# Patient Record
Sex: FEMALE | Race: WHITE | NOT HISPANIC OR LATINO | Employment: UNEMPLOYED | URBAN - METROPOLITAN AREA
[De-identification: names, ages, dates, MRNs, and addresses within clinical notes are randomized per-mention and may not be internally consistent; named-entity substitution may affect disease eponyms.]

---

## 2020-09-15 ENCOUNTER — ESTABLISHED COMPREHENSIVE EXAM (OUTPATIENT)
Dept: URBAN - METROPOLITAN AREA CLINIC 32 | Facility: CLINIC | Age: 75
End: 2020-09-15

## 2020-09-15 DIAGNOSIS — H25.13: ICD-10-CM

## 2020-09-15 DIAGNOSIS — H25.013: ICD-10-CM

## 2020-09-15 PROCEDURE — 92015 DETERMINE REFRACTIVE STATE: CPT

## 2020-09-15 PROCEDURE — 92014 COMPRE OPH EXAM EST PT 1/>: CPT

## 2020-09-15 ASSESSMENT — KERATOMETRY
OD_AXISANGLE_DEGREES: 93
OS_AXISANGLE_DEGREES: 117
OD_K2POWER_DIOPTERS: 44
OS_AXISANGLE2_DEGREES: 27
OS_K2POWER_DIOPTERS: 44.75
OD_K1POWER_DIOPTERS: 42.75
OD_AXISANGLE2_DEGREES: 3
OS_K1POWER_DIOPTERS: 44

## 2020-09-15 ASSESSMENT — VISUAL ACUITY
OD_SC: 20/400
OS_GLARE: <20/400
OD_CC: J2
OD_CC: 20/30+2
OD_GLARE: <20/400
OS_SC: 20/200
OS_SC: J16-1
OS_CC: 20/30-2
OD_SC: 20/200
OS_CC: J3-2

## 2020-09-15 ASSESSMENT — TONOMETRY
OD_IOP_MMHG: 11
OS_IOP_MMHG: 11

## 2020-10-29 ENCOUNTER — SURGICAL TESTING (OUTPATIENT)
Dept: URBAN - METROPOLITAN AREA CLINIC 32 | Facility: CLINIC | Age: 75
End: 2020-10-29

## 2020-10-29 DIAGNOSIS — H25.011: ICD-10-CM

## 2020-10-29 DIAGNOSIS — H25.12: ICD-10-CM

## 2020-10-29 DIAGNOSIS — E11.9: ICD-10-CM

## 2020-10-29 PROCEDURE — 92014 COMPRE OPH EXAM EST PT 1/>: CPT

## 2020-10-29 PROCEDURE — 92136TC INTERFEROMETRY - TECHNICAL COMPONENT

## 2020-10-29 PROCEDURE — 92286 ANT SGM IMG I&R SPECLR MIC: CPT

## 2020-10-29 PROCEDURE — 92025 CPTRIZED CORNEAL TOPOGRAPHY: CPT

## 2020-10-29 PROCEDURE — 92134 CPTRZ OPH DX IMG PST SGM RTA: CPT

## 2020-10-29 ASSESSMENT — VISUAL ACUITY
OD_CC: 20/30
OS_CC: J1
OS_CC: 20/30
OS_SC: 20/200
OD_SC: 20/400
OD_GLARE: 20/400
OD_SC: 20/200
OD_CC: J1
OS_GLARE: 20/400
OS_SC: J16

## 2020-10-29 ASSESSMENT — KERATOMETRY
OS_K1POWER_DIOPTERS: 44
OD_K2POWER_DIOPTERS: 44
OD_AXISANGLE2_DEGREES: 3
OS_AXISANGLE2_DEGREES: 27
OD_AXISANGLE_DEGREES: 93
OS_K2POWER_DIOPTERS: 44.75
OD_K1POWER_DIOPTERS: 42.75
OS_AXISANGLE_DEGREES: 117

## 2020-12-02 ENCOUNTER — SURGERY/PROCEDURE (OUTPATIENT)
Dept: URBAN - METROPOLITAN AREA CLINIC 32 | Facility: CLINIC | Age: 75
End: 2020-12-02

## 2020-12-02 DIAGNOSIS — H25.12: ICD-10-CM

## 2020-12-02 PROCEDURE — 66984 XCAPSL CTRC RMVL W/O ECP: CPT

## 2020-12-03 ENCOUNTER — 1 DAY POST-OP (OUTPATIENT)
Dept: URBAN - METROPOLITAN AREA CLINIC 32 | Facility: CLINIC | Age: 75
End: 2020-12-03

## 2020-12-03 DIAGNOSIS — Z96.1: ICD-10-CM

## 2020-12-03 PROCEDURE — 99024 POSTOP FOLLOW-UP VISIT: CPT

## 2020-12-03 ASSESSMENT — KERATOMETRY
OS_K2POWER_DIOPTERS: 44.75
OS_AXISANGLE_DEGREES: 117
OD_K2POWER_DIOPTERS: 44
OD_AXISANGLE2_DEGREES: 3
OD_AXISANGLE_DEGREES: 93
OD_K1POWER_DIOPTERS: 42.75
OS_K1POWER_DIOPTERS: 44
OS_AXISANGLE2_DEGREES: 27

## 2020-12-03 ASSESSMENT — TONOMETRY: OS_IOP_MMHG: 15

## 2020-12-07 ENCOUNTER — POST-OP CATARACT (OUTPATIENT)
Dept: URBAN - METROPOLITAN AREA CLINIC 32 | Facility: CLINIC | Age: 75
End: 2020-12-07

## 2020-12-07 DIAGNOSIS — H25.11: ICD-10-CM

## 2020-12-07 PROCEDURE — 92012 INTRM OPH EXAM EST PATIENT: CPT

## 2020-12-07 ASSESSMENT — KERATOMETRY
OD_AXISANGLE_DEGREES: 93
OS_AXISANGLE_DEGREES: 117
OD_K1POWER_DIOPTERS: 42.75
OD_AXISANGLE2_DEGREES: 3
OD_K2POWER_DIOPTERS: 44
OS_K1POWER_DIOPTERS: 44
OS_K2POWER_DIOPTERS: 44.75
OS_AXISANGLE2_DEGREES: 86
OS_AXISANGLE2_DEGREES: 27
OS_K1POWER_DIOPTERS: 44
OS_AXISANGLE_DEGREES: 117
OS_K2POWER_DIOPTERS: 44.75
OD_K2POWER_DIOPTERS: 44
OS_K1POWER_DIOPTERS: 44.25
OD_AXISANGLE_DEGREES: 93
OS_K2POWER_DIOPTERS: 43.00
OD_AXISANGLE2_DEGREES: 3
OS_AXISANGLE_DEGREES: 176
OS_AXISANGLE2_DEGREES: 27
OD_K1POWER_DIOPTERS: 42.75

## 2020-12-07 ASSESSMENT — TONOMETRY: OS_IOP_MMHG: 10

## 2020-12-07 ASSESSMENT — VISUAL ACUITY
OS_PH: 20/25
OS_SC: 20/80
OS_SC: 20/50
OS_PH: 20/60

## 2020-12-09 ENCOUNTER — SURGERY/PROCEDURE (OUTPATIENT)
Dept: URBAN - METROPOLITAN AREA CLINIC 32 | Facility: CLINIC | Age: 75
End: 2020-12-09

## 2020-12-09 DIAGNOSIS — H25.11: ICD-10-CM

## 2020-12-09 PROCEDURE — 66984 XCAPSL CTRC RMVL W/O ECP: CPT

## 2020-12-10 ENCOUNTER — 1 DAY POST-OP (OUTPATIENT)
Dept: URBAN - METROPOLITAN AREA CLINIC 32 | Facility: CLINIC | Age: 75
End: 2020-12-10

## 2020-12-10 DIAGNOSIS — Z96.1: ICD-10-CM

## 2020-12-10 PROCEDURE — 99024 POSTOP FOLLOW-UP VISIT: CPT

## 2020-12-10 ASSESSMENT — KERATOMETRY
OD_K2POWER_DIOPTERS: 44
OS_K1POWER_DIOPTERS: 44
OS_AXISANGLE_DEGREES: 117
OS_K2POWER_DIOPTERS: 44.75
OD_AXISANGLE_DEGREES: 93
OS_AXISANGLE2_DEGREES: 27
OS_K2POWER_DIOPTERS: 43.00
OS_AXISANGLE_DEGREES: 176
OD_K1POWER_DIOPTERS: 42.75
OS_AXISANGLE2_DEGREES: 86
OS_K1POWER_DIOPTERS: 44.25
OD_AXISANGLE2_DEGREES: 3

## 2020-12-10 ASSESSMENT — VISUAL ACUITY
OD_PH: 20/60
OD_SC: 20/200

## 2020-12-10 ASSESSMENT — TONOMETRY: OD_IOP_MMHG: 14

## 2020-12-11 ASSESSMENT — KERATOMETRY
OS_K2POWER_DIOPTERS: 44.75
OD_AXISANGLE2_DEGREES: 3
OS_K1POWER_DIOPTERS: 44.25
OS_AXISANGLE_DEGREES: 176
OD_K1POWER_DIOPTERS: 42.75
OS_AXISANGLE_DEGREES: 117
OD_AXISANGLE_DEGREES: 93
OS_AXISANGLE2_DEGREES: 86
OD_K2POWER_DIOPTERS: 44
OS_K1POWER_DIOPTERS: 44
OS_AXISANGLE2_DEGREES: 27
OS_K2POWER_DIOPTERS: 43.00

## 2020-12-15 ENCOUNTER — POST-OP (OUTPATIENT)
Dept: URBAN - METROPOLITAN AREA CLINIC 32 | Facility: CLINIC | Age: 75
End: 2020-12-15

## 2020-12-15 DIAGNOSIS — Z96.1: ICD-10-CM

## 2020-12-15 PROCEDURE — 99024 POSTOP FOLLOW-UP VISIT: CPT

## 2020-12-15 ASSESSMENT — KERATOMETRY
OS_K2POWER_DIOPTERS: 44.75
OD_AXISANGLE2_DEGREES: 3
OS_AXISANGLE2_DEGREES: 86
OS_AXISANGLE_DEGREES: 117
OS_K1POWER_DIOPTERS: 44
OS_K2POWER_DIOPTERS: 43.00
OS_K1POWER_DIOPTERS: 44.25
OD_AXISANGLE_DEGREES: 93
OD_K1POWER_DIOPTERS: 42.75
OS_AXISANGLE2_DEGREES: 27
OD_K2POWER_DIOPTERS: 44
OS_AXISANGLE_DEGREES: 176

## 2020-12-15 ASSESSMENT — VISUAL ACUITY
OD_SC: 20/40-2
OS_SC: J7
OD_SC: J7-1
OS_SC: 20/30-2

## 2020-12-15 ASSESSMENT — TONOMETRY
OS_IOP_MMHG: 11
OD_IOP_MMHG: 14

## 2021-01-04 ENCOUNTER — POST-OP CATARACT (OUTPATIENT)
Dept: URBAN - METROPOLITAN AREA CLINIC 32 | Facility: CLINIC | Age: 76
End: 2021-01-04

## 2021-01-04 DIAGNOSIS — Z96.1: ICD-10-CM

## 2021-01-04 PROCEDURE — 99024 POSTOP FOLLOW-UP VISIT: CPT

## 2021-01-04 ASSESSMENT — TONOMETRY
OS_IOP_MMHG: 11
OD_IOP_MMHG: 13

## 2021-01-04 ASSESSMENT — VISUAL ACUITY
OD_SC: 20/40+2
OS_SC: 20/30+2

## 2021-01-04 ASSESSMENT — KERATOMETRY
OD_AXISANGLE_DEGREES: 93
OD_K1POWER_DIOPTERS: 42.75
OS_K2POWER_DIOPTERS: 43.00
OS_K1POWER_DIOPTERS: 44.25
OS_AXISANGLE2_DEGREES: 27
OS_AXISANGLE2_DEGREES: 86
OS_K2POWER_DIOPTERS: 44.75
OS_K1POWER_DIOPTERS: 44
OD_K2POWER_DIOPTERS: 44
OS_AXISANGLE_DEGREES: 176
OD_AXISANGLE2_DEGREES: 3
OS_AXISANGLE_DEGREES: 117

## 2021-01-18 ENCOUNTER — CONSULT (OUTPATIENT)
Dept: URBAN - METROPOLITAN AREA CLINIC 32 | Facility: CLINIC | Age: 76
End: 2021-01-18

## 2021-01-18 DIAGNOSIS — H02.831: ICD-10-CM

## 2021-01-18 DIAGNOSIS — H02.834: ICD-10-CM

## 2021-01-18 DIAGNOSIS — Z96.1: ICD-10-CM

## 2021-01-18 PROCEDURE — 92285 EXTERNAL OCULAR PHOTOGRAPHY: CPT

## 2021-01-18 PROCEDURE — 92081 LIMITED VISUAL FIELD XM: CPT

## 2021-01-18 PROCEDURE — 92012 INTRM OPH EXAM EST PATIENT: CPT

## 2021-01-18 ASSESSMENT — KERATOMETRY
OS_AXISANGLE_DEGREES: 117
OS_AXISANGLE2_DEGREES: 27
OD_AXISANGLE2_DEGREES: 3
OS_K2POWER_DIOPTERS: 43.00
OS_AXISANGLE_DEGREES: 176
OS_K1POWER_DIOPTERS: 44
OD_K2POWER_DIOPTERS: 44
OS_AXISANGLE2_DEGREES: 86
OS_K1POWER_DIOPTERS: 44.25
OS_K2POWER_DIOPTERS: 44.75
OD_K1POWER_DIOPTERS: 42.75
OD_AXISANGLE_DEGREES: 93

## 2021-01-18 ASSESSMENT — VISUAL ACUITY
OD_SC: 20/40
OS_SC: 20/30

## 2021-02-04 ENCOUNTER — SURGERY/PROCEDURE (OUTPATIENT)
Dept: URBAN - METROPOLITAN AREA CLINIC 32 | Facility: CLINIC | Age: 76
End: 2021-02-04

## 2021-02-04 DIAGNOSIS — H02.834: ICD-10-CM

## 2021-02-04 DIAGNOSIS — H02.831: ICD-10-CM

## 2021-02-04 PROCEDURE — 1582350 UPPER BLEPH PER EYE FUNCTIONAL-BILATERAL

## 2021-02-04 ASSESSMENT — KERATOMETRY
OD_AXISANGLE_DEGREES: 93
OS_K2POWER_DIOPTERS: 43.00
OS_K1POWER_DIOPTERS: 44.25
OD_K1POWER_DIOPTERS: 42.75
OD_K2POWER_DIOPTERS: 44
OD_AXISANGLE2_DEGREES: 3
OS_K2POWER_DIOPTERS: 44.75
OS_AXISANGLE2_DEGREES: 86
OS_K1POWER_DIOPTERS: 44
OS_AXISANGLE_DEGREES: 176
OS_AXISANGLE2_DEGREES: 27
OS_AXISANGLE_DEGREES: 117

## 2021-02-05 ENCOUNTER — POST-OP (OUTPATIENT)
Dept: URBAN - METROPOLITAN AREA CLINIC 32 | Facility: CLINIC | Age: 76
End: 2021-02-05

## 2021-02-05 DIAGNOSIS — Z98.890: ICD-10-CM

## 2021-02-05 PROCEDURE — 99024 POSTOP FOLLOW-UP VISIT: CPT

## 2021-02-05 ASSESSMENT — KERATOMETRY
OS_AXISANGLE_DEGREES: 176
OD_K2POWER_DIOPTERS: 44
OS_K1POWER_DIOPTERS: 44
OS_K2POWER_DIOPTERS: 43.00
OS_K1POWER_DIOPTERS: 44.25
OS_AXISANGLE2_DEGREES: 86
OS_AXISANGLE_DEGREES: 117
OD_AXISANGLE_DEGREES: 93
OS_AXISANGLE2_DEGREES: 27
OD_AXISANGLE2_DEGREES: 3
OD_K1POWER_DIOPTERS: 42.75
OS_K2POWER_DIOPTERS: 44.75

## 2021-02-05 ASSESSMENT — VISUAL ACUITY
OD_SC: 20/40
OS_SC: 20/30

## 2021-02-12 ENCOUNTER — POST-OP (OUTPATIENT)
Dept: URBAN - METROPOLITAN AREA CLINIC 32 | Facility: CLINIC | Age: 76
End: 2021-02-12

## 2021-02-12 DIAGNOSIS — Z98.890: ICD-10-CM

## 2021-02-12 PROCEDURE — 99024 POSTOP FOLLOW-UP VISIT: CPT

## 2021-02-12 ASSESSMENT — KERATOMETRY
OS_AXISANGLE_DEGREES: 176
OS_AXISANGLE_DEGREES: 117
OD_AXISANGLE_DEGREES: 93
OS_AXISANGLE2_DEGREES: 27
OS_K2POWER_DIOPTERS: 43.00
OD_K2POWER_DIOPTERS: 44
OS_K1POWER_DIOPTERS: 44.25
OS_K2POWER_DIOPTERS: 44.75
OS_K1POWER_DIOPTERS: 44
OD_AXISANGLE2_DEGREES: 3
OD_K1POWER_DIOPTERS: 42.75
OS_AXISANGLE2_DEGREES: 86

## 2021-02-12 ASSESSMENT — VISUAL ACUITY
OD_SC: 20/25-2
OS_SC: 20/25-1
OU_SC: 20/25-1

## 2021-02-13 ENCOUNTER — EST. PATIENT EMERGENCY (OUTPATIENT)
Dept: URBAN - METROPOLITAN AREA CLINIC 32 | Facility: CLINIC | Age: 76
End: 2021-02-13

## 2021-02-13 DIAGNOSIS — T81.30XA: ICD-10-CM

## 2021-02-13 DIAGNOSIS — Z98.890: ICD-10-CM

## 2021-02-13 PROCEDURE — 12020 TX SUPFC WND DEHSN SMPL CLSR: CPT

## 2021-02-22 ASSESSMENT — KERATOMETRY
OS_K1POWER_DIOPTERS: 44.25
OS_AXISANGLE_DEGREES: 176
OD_AXISANGLE_DEGREES: 93
OD_K1POWER_DIOPTERS: 42.75
OD_K2POWER_DIOPTERS: 44
OS_K1POWER_DIOPTERS: 44
OS_K2POWER_DIOPTERS: 44.75
OS_K2POWER_DIOPTERS: 43.00
OS_AXISANGLE_DEGREES: 117
OD_AXISANGLE2_DEGREES: 3
OS_AXISANGLE2_DEGREES: 86
OS_AXISANGLE2_DEGREES: 27

## 2021-03-01 ENCOUNTER — OFFICE VISIT (OUTPATIENT)
Age: 76
End: 2021-03-01

## 2021-03-05 ENCOUNTER — FOLLOW UP (OUTPATIENT)
Dept: URBAN - METROPOLITAN AREA CLINIC 32 | Facility: CLINIC | Age: 76
End: 2021-03-05

## 2021-03-05 DIAGNOSIS — Z98.890: ICD-10-CM

## 2021-03-05 DIAGNOSIS — T81.30XD: ICD-10-CM

## 2021-03-05 PROCEDURE — 99024 POSTOP FOLLOW-UP VISIT: CPT

## 2021-03-05 ASSESSMENT — KERATOMETRY
OD_AXISANGLE2_DEGREES: 3
OS_K1POWER_DIOPTERS: 44
OS_AXISANGLE_DEGREES: 117
OS_K2POWER_DIOPTERS: 44.75
OS_AXISANGLE_DEGREES: 176
OS_K1POWER_DIOPTERS: 44.25
OD_K2POWER_DIOPTERS: 44
OD_AXISANGLE_DEGREES: 93
OD_K1POWER_DIOPTERS: 42.75
OS_AXISANGLE2_DEGREES: 27
OS_K2POWER_DIOPTERS: 43.00
OS_AXISANGLE2_DEGREES: 86

## 2021-03-05 ASSESSMENT — VISUAL ACUITY
OS_SC: 20/25-1
OD_SC: 20/25-1

## 2021-04-01 ENCOUNTER — OFFICE VISIT (OUTPATIENT)
Age: 76
End: 2021-04-01

## 2021-11-01 ENCOUNTER — OFFICE VISIT (OUTPATIENT)
Age: 76
End: 2021-11-01

## 2021-11-10 ENCOUNTER — OFFICE VISIT (OUTPATIENT)
Dept: URBAN - METROPOLITAN AREA CLINIC 9 | Facility: CLINIC | Age: 76
End: 2021-11-10

## 2021-11-17 ENCOUNTER — OFFICE VISIT (OUTPATIENT)
Dept: URBAN - METROPOLITAN AREA CLINIC 9 | Facility: CLINIC | Age: 76
End: 2021-11-17

## 2021-11-23 ENCOUNTER — OFFICE VISIT (OUTPATIENT)
Dept: URBAN - METROPOLITAN AREA CLINIC 9 | Facility: CLINIC | Age: 76
End: 2021-11-23

## 2021-12-01 ENCOUNTER — OFFICE VISIT (OUTPATIENT)
Dept: URBAN - METROPOLITAN AREA CLINIC 9 | Facility: CLINIC | Age: 76
End: 2021-12-01

## 2022-03-07 ENCOUNTER — COMPREHENSIVE EXAM (OUTPATIENT)
Dept: URBAN - METROPOLITAN AREA CLINIC 32 | Facility: CLINIC | Age: 77
End: 2022-03-07

## 2022-03-07 DIAGNOSIS — Z96.1: ICD-10-CM

## 2022-03-07 DIAGNOSIS — H16.223: ICD-10-CM

## 2022-03-07 DIAGNOSIS — E11.9: ICD-10-CM

## 2022-03-07 DIAGNOSIS — H26.493: ICD-10-CM

## 2022-03-07 PROCEDURE — 92015 DETERMINE REFRACTIVE STATE: CPT

## 2022-03-07 PROCEDURE — 92014 COMPRE OPH EXAM EST PT 1/>: CPT

## 2022-03-07 ASSESSMENT — TONOMETRY
OS_IOP_MMHG: 12
OD_IOP_MMHG: 17

## 2022-03-07 ASSESSMENT — KERATOMETRY
OS_AXISANGLE2_DEGREES: 27
OD_AXISANGLE_DEGREES: 93
OS_AXISANGLE2_DEGREES: 86
OD_K2POWER_DIOPTERS: 44
OD_K1POWER_DIOPTERS: 42.75
OS_K1POWER_DIOPTERS: 44
OS_AXISANGLE_DEGREES: 176
OD_AXISANGLE2_DEGREES: 3
OS_K2POWER_DIOPTERS: 43.00
OS_K1POWER_DIOPTERS: 44.25
OS_K2POWER_DIOPTERS: 44.75
OS_AXISANGLE_DEGREES: 117

## 2022-03-07 ASSESSMENT — VISUAL ACUITY
OD_CC: J1+
OS_CC: J1+
OS_SC: 20/25-1
OD_SC: 20/25-1

## 2022-03-15 ENCOUNTER — OFFICE VISIT (OUTPATIENT)
Dept: URBAN - METROPOLITAN AREA CLINIC 9 | Facility: CLINIC | Age: 77
End: 2022-03-15

## 2022-03-30 ENCOUNTER — OFFICE VISIT (OUTPATIENT)
Dept: URBAN - METROPOLITAN AREA CLINIC 9 | Facility: CLINIC | Age: 77
End: 2022-03-30

## 2022-04-25 ENCOUNTER — TELEPHONE ENCOUNTER (OUTPATIENT)
Dept: URBAN - METROPOLITAN AREA CLINIC 9 | Facility: CLINIC | Age: 77
End: 2022-04-25

## 2022-04-27 ENCOUNTER — OFFICE VISIT (OUTPATIENT)
Dept: URBAN - METROPOLITAN AREA CLINIC 9 | Facility: CLINIC | Age: 77
End: 2022-04-27

## 2022-06-04 ENCOUNTER — TELEPHONE ENCOUNTER (OUTPATIENT)
Dept: URBAN - METROPOLITAN AREA CLINIC 68 | Facility: CLINIC | Age: 77
End: 2022-06-04

## 2022-06-04 RX ORDER — ATENOLOL 50 MG/1
TABLET ORAL
OUTPATIENT
Start: 2008-11-18 | End: 2014-10-29

## 2022-06-04 RX ORDER — ASCORBIC ACID 1000 MG
MAGNESIUM OXIDE( 250MG ORAL  DAILY ) INACTIVE -HX ENTRY TABLET ORAL DAILY
OUTPATIENT
Start: 2019-01-14

## 2022-06-04 RX ORDER — ESOMEPRAZOLE MAGNESIUM 40 MG
NEXIUM(    ONE CAPSULE BY MOUTH TWICE DAILY ) INACTIVE -HX ENTRY CAPSULE,DELAYED RELEASE (ENTERIC COATED) ORAL
OUTPATIENT
Start: 2009-01-14

## 2022-06-04 RX ORDER — MAGNESIUM OXIDE/MAG AA CHELATE 300 MG
CAPSULE ORAL AS DIRECTED
OUTPATIENT
Start: 2010-09-22 | End: 2014-10-29

## 2022-06-04 RX ORDER — FERROUS SULFATE 325(65) MG
VITAMIN D3( 2000UNIT ORAL  DAILY ) INACTIVE -HX ENTRY TABLET ORAL DAILY
OUTPATIENT
Start: 2019-01-14

## 2022-06-04 RX ORDER — ESOMEPRAZOLE MAGNESIUM 40 MG
NEXIUM(    ONE CAPSULE BY MOUTH TWICE DAILY ) INACTIVE -HX ENTRY CAPSULE,DELAYED RELEASE (ENTERIC COATED) ORAL
OUTPATIENT
Start: 2009-01-26

## 2022-06-04 RX ORDER — DICYCLOMINE HYDROCHLORIDE 20 MG/1
TABLET ORAL
Qty: 90 | Refills: 0 | OUTPATIENT
Start: 2019-05-30 | End: 2019-06-29

## 2022-06-04 RX ORDER — METFORMIN HYDROCHLORIDE 1000 MG/1
METFORMIN HCL(    1 TABLET TWICE DAILY WITH FOOD ) INACTIVE -HX ENTRY TABLET, COATED ORAL
OUTPATIENT
Start: 2009-01-14

## 2022-06-04 RX ORDER — METFORMIN HYDROCHLORIDE 500 MG/1
METFORMIN HCL(    1 TABLET TWICE DAILY WITH FOOD ) INACTIVE -HX ENTRY TABLET, COATED ORAL
OUTPATIENT
Start: 2008-11-18

## 2022-06-04 RX ORDER — TRAMADOL HYDROCHLORIDE 50 MG/1
TRAMADOL HCL(    1 TABLET EVERY 4 TO 6 HOURS AS NEEDED ) INACTIVE -HX ENTRY TABLET ORAL
OUTPATIENT
Start: 2005-12-19

## 2022-06-04 RX ORDER — SITAGLIPTIN AND METFORMIN HYDROCHLORIDE 50; 1000 MG/1; MG/1
JANUMET XR( 50-1000MG ORAL  DAILY ) INACTIVE -HX ENTRY TABLET, FILM COATED, EXTENDED RELEASE ORAL DAILY
OUTPATIENT
Start: 2015-12-23

## 2022-06-04 RX ORDER — ELUXADOLINE 75 MG/1
TABLET, FILM COATED ORAL BID
Qty: 32 | Refills: 0 | OUTPATIENT
Start: 2016-09-07 | End: 2016-09-27

## 2022-06-04 RX ORDER — RIFAXIMIN 550 MG/1
TABLET ORAL
Qty: 42 | Refills: 0 | OUTPATIENT
Start: 2016-09-27 | End: 2016-10-11

## 2022-06-04 RX ORDER — METFORMIN HYDROCHLORIDE 1000 MG/1
METFORMIN HCL ER (OSM)( 1000MG ORAL  TWICE A DAY ) INACTIVE -HX ENTRY TABLET, EXTENDED RELEASE ORAL TWICE A DAY
OUTPATIENT
Start: 2019-05-30

## 2022-06-04 RX ORDER — ESOMEPRAZOLE MAGNESIUM 40 MG
NEXIUM(    ONE CAPSULE BY MOUTH TWICE DAILY ) INACTIVE -HX ENTRY CAPSULE,DELAYED RELEASE (ENTERIC COATED) ORAL
OUTPATIENT
Start: 2009-02-02

## 2022-06-04 RX ORDER — MULTIVIT-MIN/FOLIC/VIT K/LYCOP 400-300MCG
VITAMIN D3(    1 TABLET DAILY ) INACTIVE -HX ENTRY TABLET ORAL
OUTPATIENT
Start: 2010-06-30

## 2022-06-04 RX ORDER — CANAGLIFLOZIN 100 MG/1
INVOKANA( 100MG ORAL 150 MG AS NEEDED ) INACTIVE -HX ENTRY TABLET, FILM COATED ORAL AS NEEDED
OUTPATIENT
Start: 2019-01-14

## 2022-06-04 RX ORDER — COLESEVELAM HYDROCHLORIDE 625 MG/1
WELCHOL(    ONE ORALLY THREE TIMES DAILY ) INACTIVE -HX ENTRY TABLET, FILM COATED ORAL
OUTPATIENT
Start: 2009-06-24

## 2022-06-04 RX ORDER — BUDESONIDE 3 MG/1
CAPSULE, COATED PELLETS ORAL DAILY
Qty: 180 | Refills: 180 | OUTPATIENT
Start: 2019-02-07 | End: 2019-05-30

## 2022-06-04 RX ORDER — MULTIVIT-MIN/FOLIC/VIT K/LYCOP 400-300MCG
TABLET ORAL
OUTPATIENT
Start: 2010-09-22 | End: 2014-10-29

## 2022-06-04 RX ORDER — MELOXICAM 7.5 MG/1
MELOXICAM( 7.5MG ORAL  AS NEEDED ) INACTIVE -HX ENTRY TABLET ORAL AS NEEDED
OUTPATIENT
Start: 2019-01-14

## 2022-06-04 RX ORDER — METAXALONE 800 MG
SKELAXIN(    AS NEEDED ) INACTIVE -HX ENTRY TABLET ORAL AS NEEDED
OUTPATIENT
Start: 2008-11-18

## 2022-06-04 RX ORDER — COLESEVELAM HYDROCHLORIDE 625 MG/1
TABLET, FILM COATED ORAL
Qty: 90 | Refills: 0 | OUTPATIENT
Start: 2019-04-25 | End: 2019-05-25

## 2022-06-04 RX ORDER — MELOXICAM 7.5 MG
MOBIC(    1 TABLET DAILY ) INACTIVE -HX ENTRY TABLET ORAL
OUTPATIENT
Start: 2005-12-19

## 2022-06-04 RX ORDER — EZETIMIBE 10 MG/1
TABLET ORAL
OUTPATIENT
Start: 2005-12-19 | End: 2014-10-29

## 2022-06-04 RX ORDER — ALOSETRON HYDROCHLORIDE 0.5 MG/1
TABLET ORAL
Qty: 60 | Refills: 0 | OUTPATIENT
Start: 2020-01-16 | End: 2020-02-15

## 2022-06-04 RX ORDER — ROSUVASTATIN CALCIUM 10 MG
TABLET ORAL
OUTPATIENT
Start: 2010-06-30 | End: 2014-10-29

## 2022-06-04 RX ORDER — PANTOPRAZOLE SODIUM 40 MG/1
TABLET, DELAYED RELEASE ORAL DAILY
Qty: 180 | Refills: 0 | OUTPATIENT
Start: 2019-05-30 | End: 2019-08-28

## 2022-06-04 RX ORDER — LEVOTHYROXINE SODIUM 75 UG/1
SYNTHROID(    1 TABLET DAILY ) INACTIVE -HX ENTRY TABLET ORAL
OUTPATIENT
Start: 2005-12-19

## 2022-06-04 RX ORDER — CARBAMAZEPINE 200 MG/1
TABLET ORAL
OUTPATIENT
Start: 2010-09-22 | End: 2014-10-29

## 2022-06-04 RX ORDER — OMEPRAZOLE 20 MG/1
OMEPRAZOLE( 20MG ORAL  DAILY ) INACTIVE -HX ENTRY CAPSULE, DELAYED RELEASE ORAL DAILY
OUTPATIENT
Start: 2019-04-25

## 2022-06-04 RX ORDER — POLYETHYLENE GLYCOL 3350, SODIUM SULFATE, SODIUM CHLORIDE, POTASSIUM CHLORIDE, ASCORBIC ACID, SODIUM ASCORBATE 7.5-2.691G
KIT ORAL
Qty: 1 | Refills: 0 | OUTPATIENT
Start: 2015-01-14 | End: 2015-02-16

## 2022-06-04 RX ORDER — METFORMIN HYDROCHLORIDE 1000 MG/1
METFORMIN HCL( 1000MG ORAL  TWICE DAILY ) INACTIVE -HX ENTRY TABLET, COATED ORAL TWICE DAILY
OUTPATIENT
Start: 2019-05-30

## 2022-06-04 RX ORDER — PREDNISONE 10 MG/1
TABLET ORAL
Qty: 70 | Refills: 0 | OUTPATIENT
Start: 2019-05-02 | End: 2019-05-30

## 2022-06-04 RX ORDER — HYOSCYAMINE SULFATE 0.38 MG/1
LEVBID(    1 TABLET EVERY 12 HOURS ) INACTIVE -HX ENTRY TABLET, EXTENDED RELEASE ORAL
OUTPATIENT
Start: 2006-03-03

## 2022-06-04 RX ORDER — ASCORBIC ACID
VITAMIN E( 200UNIT ORAL  DAILY ) INACTIVE -HX ENTRY CRYSTALS ORAL DAILY
OUTPATIENT
Start: 2016-08-16

## 2022-06-04 RX ORDER — PIOGLITAZONE HCL 45 MG
ACTOS(    1 TABLET DAILY ) INACTIVE -HX ENTRY TABLET ORAL
OUTPATIENT
Start: 2005-12-19

## 2022-06-04 RX ORDER — SOLIFENACIN SUCCINATE 5 MG/1
VESICARE( 5MG ORAL  DAILY ) INACTIVE -HX ENTRY TABLET, FILM COATED ORAL DAILY
OUTPATIENT
Start: 2019-01-14

## 2022-06-04 RX ORDER — COLESEVELAM HYDROCHLORIDE 625 MG/1
WELCHOL(    ONE ORALLY THREE TIMES DAILY ) INACTIVE -HX ENTRY TABLET, FILM COATED ORAL
OUTPATIENT
Start: 2006-06-26

## 2022-06-04 RX ORDER — FUROSEMIDE 40 MG/1
TABLET ORAL
OUTPATIENT
Start: 2005-12-19 | End: 2014-10-29

## 2022-06-05 ENCOUNTER — TELEPHONE ENCOUNTER (OUTPATIENT)
Dept: URBAN - METROPOLITAN AREA CLINIC 68 | Facility: CLINIC | Age: 77
End: 2022-06-05

## 2022-06-05 RX ORDER — DIPHENOXYLATE HYDROCHLORIDE AND ATROPINE SULFATE 2.5; .025 MG/1; MG/1
TABLET ORAL
Qty: 30 | Refills: 30 | Status: ACTIVE | COMMUNITY
Start: 2020-02-20

## 2022-06-05 RX ORDER — CELECOXIB 200 MG/1
CELECOXIB( 200MG ORAL  DAILY ) ACTIVE -HX ENTRY CAPSULE ORAL DAILY
Status: ACTIVE | COMMUNITY
Start: 2020-03-05

## 2022-06-05 RX ORDER — RIFAXIMIN 550 MG/1
TABLET ORAL
Qty: 42 | Refills: 42 | Status: ACTIVE | COMMUNITY
Start: 2020-03-05

## 2022-06-05 RX ORDER — TRAZODONE HYDROCHLORIDE 50 MG/1
TRAZODONE HCL( 50MG ORAL  DAILY ) ACTIVE -HX ENTRY TABLET ORAL DAILY
Status: ACTIVE | COMMUNITY
Start: 2020-03-05

## 2022-06-05 RX ORDER — LEVOTHYROXINE SODIUM 100 UG/1
LEVOTHYROXINE SODIUM( 100MCG ORAL  DAILY ) ACTIVE -HX ENTRY CAPSULE ORAL DAILY
Status: ACTIVE | COMMUNITY
Start: 2020-03-05

## 2022-06-05 RX ORDER — CYCLOBENZAPRINE HYDROCHLORIDE 10 MG/10MG
CYCLOBENZAPRINE HCL( 10MG ORAL  DAILY ) ACTIVE -HX ENTRY TABLET ORAL DAILY
Status: ACTIVE | COMMUNITY
Start: 2020-03-05

## 2022-06-05 RX ORDER — MIRABEGRON 25 MG/1
MYRBETRIQ( 25MG ORAL  DAILY ) ACTIVE -HX ENTRY TABLET, FILM COATED, EXTENDED RELEASE ORAL DAILY
Status: ACTIVE | COMMUNITY
Start: 2020-03-05

## 2022-06-05 RX ORDER — LEVOTHYROXINE SODIUM 112 UG/1
LEVOXYL( 112MCG ORAL  DAILY ) ACTIVE -HX ENTRY TABLET ORAL DAILY
Status: ACTIVE | COMMUNITY
Start: 2020-03-05

## 2022-06-05 RX ORDER — EMPAGLIFLOZIN 25 MG/1
JARDIANCE( 25MG ORAL  DAILY ) ACTIVE -HX ENTRY TABLET, FILM COATED ORAL DAILY
Status: ACTIVE | COMMUNITY
Start: 2020-03-05

## 2022-06-05 RX ORDER — HYDROCHLOROTHIAZIDE 25 MG/1
HYDROCHLOROTHIAZIDE( 25MG ORAL  DAILY ) ACTIVE -HX ENTRY TABLET ORAL DAILY
Status: ACTIVE | COMMUNITY
Start: 2020-03-05

## 2022-06-05 RX ORDER — DULOXETINE 30 MG/1
DULOXETINE HCL( 30MG ORAL  DAILY ) ACTIVE -HX ENTRY CAPSULE, DELAYED RELEASE PELLETS ORAL DAILY
Status: ACTIVE | COMMUNITY
Start: 2020-03-05

## 2022-06-05 RX ORDER — ALPRAZOLAM 0.25 MG/1
ALPRAZOLAM( 0.25MG ORAL  AS NEEDED ) ACTIVE -HX ENTRY TABLET ORAL AS NEEDED
Status: ACTIVE | COMMUNITY
Start: 2020-03-05

## 2022-06-05 RX ORDER — ATORVASTATIN CALCIUM 40 MG/1
ATORVASTATIN CALCIUM( 40MG ORAL  DAILY ) ACTIVE -HX ENTRY TABLET, FILM COATED ORAL DAILY
Status: ACTIVE | COMMUNITY
Start: 2020-03-05

## 2022-06-05 RX ORDER — GABAPENTIN 800 MG/1
GABAPENTIN( 800MG ORAL  THREE TIMES DAILY ) ACTIVE -HX ENTRY TABLET, FILM COATED ORAL
Status: ACTIVE | COMMUNITY
Start: 2020-03-05

## 2022-06-05 RX ORDER — ATENOLOL 50 MG/1
ATENOLOL( 50MG ORAL  TWICE DAILY ) ACTIVE -HX ENTRY TABLET ORAL TWICE DAILY
Status: ACTIVE | COMMUNITY
Start: 2020-03-05

## 2022-06-05 RX ORDER — LOSARTAN POTASSIUM 50 MG/1
LOSARTAN POTASSIUM( 50MG ORAL  DAILY ) ACTIVE -HX ENTRY TABLET ORAL DAILY
Status: ACTIVE | COMMUNITY
Start: 2020-03-05

## 2022-06-05 RX ORDER — HUMAN INSULIN 100 [IU]/ML
NOVOLIN N( 100UNIT/ML SUBCUTANEOUS 10 UNITS DAILY ) ACTIVE -HX ENTRY INJECTION, SUSPENSION SUBCUTANEOUS DAILY
Status: ACTIVE | COMMUNITY
Start: 2020-03-05

## 2022-06-25 ENCOUNTER — TELEPHONE ENCOUNTER (OUTPATIENT)
Age: 77
End: 2022-06-25

## 2022-06-25 RX ORDER — DULOXETINE HCL 20 MG
CYMBALTA(    QD ) INACTIVE -HX ENTRY CAPSULE,DELAYED RELEASE (ENTERIC COATED) ORAL QD
OUTPATIENT
Start: 2005-12-19

## 2022-06-25 RX ORDER — CHLORHEXIDINE GLUCONATE 4 %
MAGNESIUM OXIDE( 250MG ORAL  DAILY ) INACTIVE -HX ENTRY LIQUID (ML) TOPICAL DAILY
OUTPATIENT
Start: 2019-01-14

## 2022-06-25 RX ORDER — BUDESONIDE 3 MG/1
CAPSULE, COATED PELLETS ORAL DAILY
Qty: 180 | Refills: 180 | OUTPATIENT
Start: 2019-02-07 | End: 2019-05-30

## 2022-06-25 RX ORDER — LEVOTHYROXINE SODIUM 75 MCG
SYNTHROID(    1 TABLET DAILY ) INACTIVE -HX ENTRY TABLET ORAL
OUTPATIENT
Start: 2005-12-19

## 2022-06-25 RX ORDER — COLESEVELAM HYDROCHLORIDE 625 MG/1
WELCHOL(    ONE ORALLY THREE TIMES DAILY ) INACTIVE -HX ENTRY TABLET, FILM COATED ORAL
OUTPATIENT
Start: 2009-06-24

## 2022-06-25 RX ORDER — ATENOLOL 50 MG/1
TABLET ORAL
OUTPATIENT
Start: 2008-11-18 | End: 2014-10-29

## 2022-06-25 RX ORDER — DIPHENOXYLATE HCL/ATROPINE 2.5-.025MG
LOMOTIL(    1 TABLET 4 TIMES DAILY AS NEEDED ) INACTIVE -HX ENTRY TABLET ORAL
OUTPATIENT
Start: 2011-04-11

## 2022-06-25 RX ORDER — COLESEVELAM HYDROCHLORIDE 625 MG/1
WELCHOL(    ONE ORALLY THREE TIMES DAILY ) INACTIVE -HX ENTRY TABLET, FILM COATED ORAL
OUTPATIENT
Start: 2006-06-26

## 2022-06-25 RX ORDER — DICYCLOMINE HCL 100 %
DICYCLOMINE HCL(    AS DIRECTED ) INACTIVE -HX ENTRY POWDER (GRAM) MISCELLANEOUS AS DIRECTED
OUTPATIENT
Start: 2009-01-14

## 2022-06-25 RX ORDER — FERROUS SULFATE 220 (44)/5
IRON SUPPLEMENT( 325 (65 FE)MG ORAL  TWICE  DAILY ) INACTIVE -HX ENTRY SOLUTION, ORAL ORAL
OUTPATIENT
Start: 2015-12-23

## 2022-06-25 RX ORDER — CHLORHEXIDINE GLUCONATE 4 %
VITAMIN B12 TR( 1000MCG ORAL  DAILY ) INACTIVE -HX ENTRY LIQUID (ML) TOPICAL DAILY
OUTPATIENT
Start: 2016-08-16

## 2022-06-25 RX ORDER — MAGNESIUM OXIDE/MAG AA CHELATE 300 MG
CAPSULE ORAL AS DIRECTED
OUTPATIENT
Start: 2010-09-22 | End: 2014-10-29

## 2022-06-25 RX ORDER — OMEPRAZOLE 20 MG/1
OMEPRAZOLE( 20MG ORAL  DAILY ) INACTIVE -HX ENTRY CAPSULE, DELAYED RELEASE ORAL DAILY
OUTPATIENT
Start: 2019-04-25

## 2022-06-25 RX ORDER — DICYCLOMINE HCL 100 %
DICYCLOMINE HCL(    AS DIRECTED ) INACTIVE -HX ENTRY POWDER (GRAM) MISCELLANEOUS AS DIRECTED
OUTPATIENT
Start: 2009-05-28

## 2022-06-25 RX ORDER — PANTOPRAZOLE 40 MG/1
TABLET, DELAYED RELEASE ORAL DAILY
Qty: 180 | Refills: 0 | OUTPATIENT
Start: 2019-05-30 | End: 2019-08-28

## 2022-06-25 RX ORDER — EZETIMIBE 10 MG/1
TABLET ORAL
OUTPATIENT
Start: 2005-12-19 | End: 2014-10-29

## 2022-06-25 RX ORDER — PEPPERMINT OIL 90 MG
IBGARD(    AS DIRECTED ) INACTIVE -HX ENTRY CAPSULE, DELAYED, AND EXTENDED RELEASE ORAL AS DIRECTED
OUTPATIENT
Start: 2019-05-30

## 2022-06-25 RX ORDER — OMEGA-3/DHA/EPA/FISH OIL 1000 MG
CAPSULE ORAL
OUTPATIENT
Start: 2010-09-22 | End: 2014-10-29

## 2022-06-25 RX ORDER — SITAGLIPTIN PHOSPHATE 100 MG
JANUVIA(    BID ) INACTIVE -HX ENTRY TABLET ORAL BID
OUTPATIENT
Start: 2008-11-18

## 2022-06-25 RX ORDER — ELUXADOLINE 75 MG/1
TABLET, FILM COATED ORAL BID
Qty: 32 | Refills: 0 | OUTPATIENT
Start: 2016-09-07 | End: 2016-09-27

## 2022-06-25 RX ORDER — FUROSEMIDE 40 MG/1
TABLET ORAL
OUTPATIENT
Start: 2005-12-19 | End: 2014-10-29

## 2022-06-25 RX ORDER — LEVOTHYROXINE SODIUM 100 MCG
SYNTHROID(    1 TABLET DAILY ) INACTIVE -HX ENTRY TABLET ORAL
OUTPATIENT
Start: 2009-01-14

## 2022-06-25 RX ORDER — CARBAMAZEPINE 200 MG/1
TABLET ORAL
OUTPATIENT
Start: 2010-09-22 | End: 2014-10-29

## 2022-06-25 RX ORDER — DICYCLOMINE HCL 100 %
DICYCLOMINE HCL(    AS DIRECTED ) INACTIVE -HX ENTRY POWDER (GRAM) MISCELLANEOUS AS DIRECTED
OUTPATIENT
Start: 2009-02-02

## 2022-06-25 RX ORDER — DULOXETINE HCL 20 MG
CYMBALTA(    ONE TABLET ORALLY EACH DAY ) INACTIVE -HX ENTRY CAPSULE,DELAYED RELEASE (ENTERIC COATED) ORAL
OUTPATIENT
Start: 2009-01-14

## 2022-06-25 RX ORDER — RIFAXIMIN 550 MG/1
TABLET ORAL
Qty: 42 | Refills: 0 | OUTPATIENT
Start: 2016-09-27 | End: 2016-10-11

## 2022-06-25 RX ORDER — BISACODYL 5 MG/1
HALFLYTELY & BISACODYL(    AS DIRECTED ) INACTIVE -HX ENTRY TABLET, COATED ORAL AS DIRECTED
OUTPATIENT
Start: 2009-06-24

## 2022-06-25 RX ORDER — HYOSCYAMINE SULFATE 0.38 MG/1
LEVBID(    1 TABLET EVERY 12 HOURS ) INACTIVE -HX ENTRY TABLET, EXTENDED RELEASE ORAL
OUTPATIENT
Start: 2006-03-03

## 2022-06-25 RX ORDER — DICYCLOMINE HYDROCHLORIDE 20 MG/1
TABLET ORAL
Qty: 90 | Refills: 0 | OUTPATIENT
Start: 2019-05-30 | End: 2019-06-29

## 2022-06-25 RX ORDER — METFORMIN HCL 1000 MG/1
METFORMIN HCL(    1 TABLET TWICE DAILY WITH FOOD ) INACTIVE -HX ENTRY TABLET ORAL
OUTPATIENT
Start: 2009-01-14

## 2022-06-25 RX ORDER — PIOGLITAZONE HCL 45 MG
ACTOS(    1 TABLET DAILY ) INACTIVE -HX ENTRY TABLET ORAL
OUTPATIENT
Start: 2005-12-19

## 2022-06-25 RX ORDER — SOLIFENACIN SUCCINATE 5 MG/1
VESICARE( 5MG ORAL  DAILY ) INACTIVE -HX ENTRY TABLET, FILM COATED ORAL DAILY
OUTPATIENT
Start: 2019-01-14

## 2022-06-25 RX ORDER — ALOSETRON HYDROCHLORIDE 0.5 MG/1
TABLET ORAL
Qty: 60 | Refills: 0 | OUTPATIENT
Start: 2020-01-16 | End: 2020-02-15

## 2022-06-25 RX ORDER — MULTIVIT-MIN/FOLIC/VIT K/LYCOP 400-300MCG
TABLET ORAL
OUTPATIENT
Start: 2010-09-22 | End: 2014-10-29

## 2022-06-25 RX ORDER — METFORMIN HYDROCHLORIDE 1000 MG/1
METFORMIN HCL ER (OSM)( 1000MG ORAL  TWICE A DAY ) INACTIVE -HX ENTRY TABLET, FILM COATED, EXTENDED RELEASE ORAL TWICE A DAY
OUTPATIENT
Start: 2019-05-30

## 2022-06-25 RX ORDER — POLYETHYLENE GLYCOL 3350, SODIUM SULFATE, SODIUM CHLORIDE, POTASSIUM CHLORIDE, ASCORBIC ACID, SODIUM ASCORBATE 7.5-2.691G
KIT ORAL
Qty: 1 | Refills: 0 | OUTPATIENT
Start: 2015-01-14 | End: 2015-02-16

## 2022-06-25 RX ORDER — POTASSIUM CHLORIDE 750 MG/1
K-DUR(    1 POQD ) INACTIVE -HX ENTRY TABLET, EXTENDED RELEASE ORAL
OUTPATIENT
Start: 2005-12-19

## 2022-06-25 RX ORDER — DICYCLOMINE HCL 100 %
DICYCLOMINE HCL(    AS DIRECTED ) INACTIVE -HX ENTRY POWDER (GRAM) MISCELLANEOUS AS DIRECTED
OUTPATIENT
Start: 2009-02-18

## 2022-06-25 RX ORDER — MELOXICAM 7.5 MG/1
MELOXICAM( 7.5MG ORAL  AS NEEDED ) INACTIVE -HX ENTRY TABLET ORAL AS NEEDED
OUTPATIENT
Start: 2019-01-14

## 2022-06-25 RX ORDER — MULTIVIT-MIN/FOLIC/VIT K/LYCOP 400-300MCG
VITAMIN D3(    1 TABLET DAILY ) INACTIVE -HX ENTRY TABLET ORAL
OUTPATIENT
Start: 2010-06-30

## 2022-06-25 RX ORDER — FAMOTIDINE 20 MG/1
PEPCID(    TWICE A DAY ) INACTIVE -HX ENTRY TABLET, FILM COATED ORAL TWICE A DAY
OUTPATIENT
Start: 2019-05-30

## 2022-06-25 RX ORDER — ESOMEPRAZOLE MAGNESIUM 40 MG
NEXIUM(    ONE CAPSULE BY MOUTH TWICE DAILY ) INACTIVE -HX ENTRY CAPSULE,DELAYED RELEASE (ENTERIC COATED) ORAL
OUTPATIENT
Start: 2009-02-02

## 2022-06-25 RX ORDER — COLESEVELAM HYDROCHLORIDE 625 MG/1
TABLET, FILM COATED ORAL
Qty: 90 | Refills: 0 | OUTPATIENT
Start: 2019-04-25 | End: 2019-05-25

## 2022-06-25 RX ORDER — DICYCLOMINE HCL 100 %
DICYCLOMINE HCL(    AS DIRECTED ) INACTIVE -HX ENTRY POWDER (GRAM) MISCELLANEOUS AS DIRECTED
OUTPATIENT
Start: 2009-06-24

## 2022-06-25 RX ORDER — ROSUVASTATIN CALCIUM 10 MG
TABLET ORAL
OUTPATIENT
Start: 2010-06-30 | End: 2014-10-29

## 2022-06-25 RX ORDER — GLUCOSAMINE/MSM/CHONDROIT SULF 500-166.6
VITAMIN D3( 2000UNIT ORAL  DAILY ) INACTIVE -HX ENTRY TABLET ORAL DAILY
OUTPATIENT
Start: 2019-01-14

## 2022-06-25 RX ORDER — DICYCLOMINE HCL 100 %
DICYCLOMINE HCL(    AS DIRECTED ) INACTIVE -HX ENTRY POWDER (GRAM) MISCELLANEOUS AS DIRECTED
OUTPATIENT
Start: 2009-01-09

## 2022-06-25 RX ORDER — CALCIUM CARBONATE/VITAMIN D3 600 MG-10
TABLET ORAL
OUTPATIENT
Start: 2010-06-30 | End: 2014-10-29

## 2022-06-25 RX ORDER — ASCORBIC ACID
VITAMIN E( 200UNIT ORAL  DAILY ) INACTIVE -HX ENTRY CRYSTALS ORAL DAILY
OUTPATIENT
Start: 2016-08-16

## 2022-06-25 RX ORDER — TRAMADOL HYDROCHLORIDE 50 MG/1
TRAMADOL HCL(    1 TABLET EVERY 4 TO 6 HOURS AS NEEDED ) INACTIVE -HX ENTRY TABLET ORAL
OUTPATIENT
Start: 2005-12-19

## 2022-06-25 RX ORDER — METFORMIN HCL 500 MG/1
METFORMIN HCL(    1 TABLET TWICE DAILY WITH FOOD ) INACTIVE -HX ENTRY TABLET ORAL
OUTPATIENT
Start: 2008-11-18

## 2022-06-25 RX ORDER — ASPIRIN 81 MG/1
TABLET, COATED ORAL
OUTPATIENT
Start: 2010-06-30 | End: 2014-10-29

## 2022-06-25 RX ORDER — SITAGLIPTIN AND METFORMIN HYDROCHLORIDE 1000; 50 MG/1; MG/1
JANUMET XR( 50-1000MG ORAL  DAILY ) INACTIVE -HX ENTRY TABLET, FILM COATED, EXTENDED RELEASE ORAL DAILY
OUTPATIENT
Start: 2015-12-23

## 2022-06-25 RX ORDER — PREDNISONE 10 MG/1
TABLET ORAL
Qty: 70 | Refills: 0 | OUTPATIENT
Start: 2019-05-02 | End: 2019-05-30

## 2022-06-25 RX ORDER — ESOMEPRAZOLE MAGNESIUM 40 MG
NEXIUM(    ONE CAPSULE BY MOUTH TWICE DAILY ) INACTIVE -HX ENTRY CAPSULE,DELAYED RELEASE (ENTERIC COATED) ORAL
OUTPATIENT
Start: 2009-01-14

## 2022-06-25 RX ORDER — ESOMEPRAZOLE MAGNESIUM 40 MG
NEXIUM(    ONE CAPSULE BY MOUTH TWICE DAILY ) INACTIVE -HX ENTRY CAPSULE,DELAYED RELEASE (ENTERIC COATED) ORAL
OUTPATIENT
Start: 2009-01-26

## 2022-06-25 RX ORDER — RIFAXIMIN 200 MG/1
XIFAXAN(    ONE TABLET DAILY ) INACTIVE -HX ENTRY TABLET ORAL
OUTPATIENT
Start: 2008-11-18

## 2022-06-25 RX ORDER — CANAGLIFLOZIN 100 MG/1
INVOKANA( 100MG ORAL 150 MG AS NEEDED ) INACTIVE -HX ENTRY TABLET, FILM COATED ORAL AS NEEDED
OUTPATIENT
Start: 2019-01-14

## 2022-06-25 RX ORDER — GUARANA 1000 MG
MILK THISTLE(    TAKE 1 TABLET ORALLY TWICE DAILY ) INACTIVE -HX ENTRY TABLET ORAL
OUTPATIENT
Start: 2010-09-22

## 2022-06-25 RX ORDER — METFORMIN HCL 1000 MG/1
METFORMIN HCL( 1000MG ORAL  TWICE DAILY ) INACTIVE -HX ENTRY TABLET ORAL TWICE DAILY
OUTPATIENT
Start: 2019-05-30

## 2022-06-25 RX ORDER — ZOLPIDEM TARTRATE 5 MG
AMBIEN(    1 TABLET AT BEDTIME AS NEEDED ) INACTIVE -HX ENTRY TABLET ORAL
OUTPATIENT
Start: 2008-11-18

## 2022-06-25 RX ORDER — CYCLOBENZAPRINE HCL ER 15 MG/1
CAPSULE, EXTENDED RELEASE ORAL
OUTPATIENT
Start: 2010-09-22 | End: 2014-10-29

## 2022-06-26 ENCOUNTER — TELEPHONE ENCOUNTER (OUTPATIENT)
Age: 77
End: 2022-06-26

## 2022-06-26 RX ORDER — FLUTICASONE PROPIONATE 50 UG/1
FLONASE(    AS NEEDED ) ACTIVE -HX ENTRY SPRAY, METERED NASAL AS NEEDED
Status: ACTIVE | COMMUNITY
Start: 2020-03-05

## 2022-06-26 RX ORDER — LEVOTHYROXINE SODIUM 112 UG/1
LEVOXYL( 112MCG ORAL  DAILY ) ACTIVE -HX ENTRY TABLET ORAL DAILY
Status: ACTIVE | COMMUNITY
Start: 2020-03-05

## 2022-06-26 RX ORDER — TRAZODONE HYDROCHLORIDE 50 MG/1
TRAZODONE HCL( 50MG ORAL  DAILY ) ACTIVE -HX ENTRY TABLET ORAL DAILY
Status: ACTIVE | COMMUNITY
Start: 2020-03-05

## 2022-06-26 RX ORDER — RIFAXIMIN 550 MG/1
TABLET ORAL
Qty: 42 | Refills: 42 | Status: ACTIVE | COMMUNITY
Start: 2020-03-05

## 2022-06-26 RX ORDER — ASPIRIN 81 MG/1
LOW-DOSE ASPIRIN( 81MG ORAL  DOESN'T TAKE ON DAYS WITH MELOXICAM ) ACTIVE -HX ENTRY TABLET, COATED ORAL
Status: ACTIVE | COMMUNITY
Start: 2020-03-05

## 2022-06-26 RX ORDER — MIRABEGRON 25 MG/1
MYRBETRIQ( 25MG ORAL  DAILY ) ACTIVE -HX ENTRY TABLET, FILM COATED, EXTENDED RELEASE ORAL DAILY
Status: ACTIVE | COMMUNITY
Start: 2020-03-05

## 2022-06-26 RX ORDER — ALPRAZOLAM 0.25 MG/1
ALPRAZOLAM( 0.25MG ORAL  AS NEEDED ) ACTIVE -HX ENTRY TABLET ORAL AS NEEDED
Status: ACTIVE | COMMUNITY
Start: 2020-03-05

## 2022-06-26 RX ORDER — OXYBUTYNIN CHLORIDE 10 MG/1
OXYBUTYNIN CHLORIDE( 10MG ORAL  DAILY ) ACTIVE -HX ENTRY TABLET, EXTENDED RELEASE ORAL DAILY
Status: ACTIVE | COMMUNITY
Start: 2020-03-05

## 2022-06-26 RX ORDER — DIPHENOXYLATE HYDROCHLORIDE AND ATROPINE SULFATE 2.5; .025 MG/1; MG/1
TABLET ORAL
Qty: 30 | Refills: 30 | Status: ACTIVE | COMMUNITY
Start: 2020-02-20

## 2022-06-26 RX ORDER — HUMAN INSULIN 100 [IU]/ML
NOVOLIN N( 100UNIT/ML SUBCUTANEOUS 10 UNITS DAILY ) ACTIVE -HX ENTRY INJECTION, SUSPENSION SUBCUTANEOUS DAILY
Status: ACTIVE | COMMUNITY
Start: 2020-03-05

## 2022-06-26 RX ORDER — GABAPENTIN 800 MG/1
GABAPENTIN( 800MG ORAL  THREE TIMES DAILY ) ACTIVE -HX ENTRY TABLET ORAL
Status: ACTIVE | COMMUNITY
Start: 2020-03-05

## 2022-06-26 RX ORDER — DULOXETINE HYDROCHLORIDE 30 MG/1
DULOXETINE HCL( 30MG ORAL  DAILY ) ACTIVE -HX ENTRY CAPSULE, DELAYED RELEASE PELLETS ORAL DAILY
Status: ACTIVE | COMMUNITY
Start: 2020-03-05

## 2022-06-26 RX ORDER — LOSARTAN POTASSIUM 50 MG/1
LOSARTAN POTASSIUM( 50MG ORAL  DAILY ) ACTIVE -HX ENTRY TABLET, FILM COATED ORAL DAILY
Status: ACTIVE | COMMUNITY
Start: 2020-03-05

## 2022-06-26 RX ORDER — CELECOXIB 200 MG/1
CELECOXIB( 200MG ORAL  DAILY ) ACTIVE -HX ENTRY CAPSULE ORAL DAILY
Status: ACTIVE | COMMUNITY
Start: 2020-03-05

## 2022-06-26 RX ORDER — ATORVASTATIN CALCIUM 40 MG/1
ATORVASTATIN CALCIUM( 40MG ORAL  DAILY ) ACTIVE -HX ENTRY TABLET, FILM COATED ORAL DAILY
Status: ACTIVE | COMMUNITY
Start: 2020-03-05

## 2022-06-26 RX ORDER — EMPAGLIFLOZIN 25 MG/1
JARDIANCE( 25MG ORAL  DAILY ) ACTIVE -HX ENTRY TABLET, FILM COATED ORAL DAILY
Status: ACTIVE | COMMUNITY
Start: 2020-03-05

## 2022-06-26 RX ORDER — LEVOTHYROXINE SODIUM 100 UG/1
LEVOTHYROXINE SODIUM( 100MCG ORAL  DAILY ) ACTIVE -HX ENTRY CAPSULE ORAL DAILY
Status: ACTIVE | COMMUNITY
Start: 2020-03-05

## 2022-06-26 RX ORDER — CYCLOBENZAPRINE HYDROCHLORIDE 10 MG/1
CYCLOBENZAPRINE HCL( 10MG ORAL  DAILY ) ACTIVE -HX ENTRY TABLET, FILM COATED ORAL DAILY
Status: ACTIVE | COMMUNITY
Start: 2020-03-05

## 2022-06-26 RX ORDER — ATENOLOL 50 MG/1
ATENOLOL( 50MG ORAL  TWICE DAILY ) ACTIVE -HX ENTRY TABLET ORAL TWICE DAILY
Status: ACTIVE | COMMUNITY
Start: 2020-03-05

## 2022-06-26 RX ORDER — HYDROCHLOROTHIAZIDE 25 MG/1
HYDROCHLOROTHIAZIDE( 25MG ORAL  DAILY ) ACTIVE -HX ENTRY TABLET ORAL DAILY
Status: ACTIVE | COMMUNITY
Start: 2020-03-05

## 2022-07-30 ENCOUNTER — TELEPHONE ENCOUNTER (OUTPATIENT)
Age: 77
End: 2022-07-30

## 2022-07-31 ENCOUNTER — TELEPHONE ENCOUNTER (OUTPATIENT)
Age: 77
End: 2022-07-31

## 2022-07-31 RX ORDER — ELUXADOLINE 75 MG/1
1 (ONE) TABLET, FILM COATED ORAL
Qty: 0 | Refills: 2 | Status: ACTIVE | COMMUNITY
Start: 2022-03-15

## 2022-07-31 RX ORDER — ELUXADOLINE 75 MG/1
1 (ONE) TABLET, FILM COATED ORAL
Qty: 0 | Refills: 3 | Status: ACTIVE | COMMUNITY
Start: 2022-03-30

## 2023-04-27 ENCOUNTER — WEB ENCOUNTER (OUTPATIENT)
Dept: URBAN - METROPOLITAN AREA CLINIC 9 | Facility: CLINIC | Age: 78
End: 2023-04-27

## 2023-04-27 ENCOUNTER — OFFICE VISIT (OUTPATIENT)
Dept: URBAN - METROPOLITAN AREA CLINIC 9 | Facility: CLINIC | Age: 78
End: 2023-04-27
Payer: MEDICARE

## 2023-04-27 VITALS
DIASTOLIC BLOOD PRESSURE: 90 MMHG | BODY MASS INDEX: 28 KG/M2 | HEIGHT: 63 IN | SYSTOLIC BLOOD PRESSURE: 150 MMHG | WEIGHT: 158 LBS

## 2023-04-27 DIAGNOSIS — K52.9 CHRONIC DIARRHEA: ICD-10-CM

## 2023-04-27 DIAGNOSIS — R10.9 ABDOMINAL PAIN, UNSPECIFIED ABDOMINAL LOCATION: ICD-10-CM

## 2023-04-27 PROCEDURE — 99214 OFFICE O/P EST MOD 30 MIN: CPT | Performed by: INTERNAL MEDICINE

## 2023-04-27 RX ORDER — SODIUM, POTASSIUM,MAG SULFATES 17.5-3.13G
177ML SOLUTION, RECONSTITUTED, ORAL ORAL ONCE
Qty: 1 | Refills: 0 | OUTPATIENT

## 2023-04-27 RX ORDER — ALBUTEROL SULFATE 90 UG/1
1 PUFF AS NEEDED AEROSOL, METERED RESPIRATORY (INHALATION)
Status: ACTIVE | COMMUNITY
Start: 2023-04-27

## 2023-04-27 RX ORDER — ELUXADOLINE 75 MG/1
1 (ONE) TABLET, FILM COATED ORAL
Qty: 0 | Refills: 3 | Status: DISCONTINUED | COMMUNITY
Start: 2022-03-30

## 2023-04-27 RX ORDER — LEVOTHYROXINE SODIUM 112 UG/1
TAKE 1 TABLET BY MOUTH ONCE DAILY TABLET ORAL
Qty: 90 EACH | Refills: 3 | Status: ACTIVE | COMMUNITY

## 2023-04-27 RX ORDER — INSULIN DEGLUDEC INJECTION 100 U/ML
AS DIRECTED INJECTION, SOLUTION SUBCUTANEOUS
Status: ACTIVE | COMMUNITY
Start: 2023-04-27

## 2023-04-27 RX ORDER — TRAZODONE HYDROCHLORIDE 100 MG/1
1 TABLET AT BEDTIME TABLET ORAL ONCE A DAY
Qty: 30 | Status: ACTIVE | COMMUNITY
Start: 2023-04-27

## 2023-04-27 RX ORDER — ALBUTEROL SULFATE 90 UG/1
INHALE 2 PUFFS BY MOUTH 4 TIMES PER DAY AS NEEDED AEROSOL, METERED RESPIRATORY (INHALATION)
Qty: 18 GRAM | Refills: 2 | Status: ACTIVE | COMMUNITY

## 2023-04-27 RX ORDER — LIRAGLUTIDE 6 MG/ML
AS DIRECTED INJECTION SUBCUTANEOUS
Status: ACTIVE | COMMUNITY
Start: 2023-04-27

## 2023-04-27 RX ORDER — TOLTERODINE 4 MG/1
TAKE ONE CAPSULE BY MOUTH ONE TIME DAILY CAPSULE, EXTENDED RELEASE ORAL
Qty: 90 UNSPECIFIED | Refills: 4 | Status: ACTIVE | COMMUNITY

## 2023-04-27 NOTE — HPI-TODAY'S VISIT:
77-year-old here for follow-up.  We last saw her March 2022.  At that point she complained of diarrhea.  This was intermittent for years with she will get urgency and loose stools she was treated with budesonide in the past.  At that time we did not have colonoscopy biopsy records just colonoscopy.  That time she was having about twice a week loose stools.  She also has a history of small bacterial overgrowth in the past she has been treated with Zenpep rifaximin and budesonide.  I did order celiac but this was not done.  I put her on Benefiber and low FODMAPs.  We did talk about Viberzi and a recourse of rifaximin.  She just tried Benefiber twice a day and was doing great.  She was having 3-4 formed stools a day.  But then she came back with loose stools.  At last visit we tried Viberzi.  Viberzi 75 twice daily actually caused constipation Patient is referred for diarrhea.  She was doing well for a while but the Viberzi was actually stopping her up.  She is currently on fiber supplementation only.  She does okay with that and food avoidance potential of episodes was really significant urgency diarrhea and fecal accidents.  She also has been having this midepigastric discomfort really when her stomach is empty.  She was on Protonix for a long time but her primary care switch her to Pepcid twice a day.  She does not know if either of those really helped.  No sustained weight loss.

## 2023-05-04 ENCOUNTER — LAB OUTSIDE AN ENCOUNTER (OUTPATIENT)
Dept: URBAN - METROPOLITAN AREA CLINIC 9 | Facility: CLINIC | Age: 78
End: 2023-05-04

## 2023-05-05 ENCOUNTER — TELEPHONE ENCOUNTER (OUTPATIENT)
Dept: URBAN - METROPOLITAN AREA CLINIC 7 | Facility: CLINIC | Age: 78
End: 2023-05-05

## 2023-05-06 LAB
A/G RATIO: 2.4
ALBUMIN: 4.8
ALKALINE PHOSPHATASE: 92
ALT (SGPT): 33
AST (SGOT): 28
BILIRUBIN, TOTAL: 0.7
BUN/CREATININE RATIO: 29
BUN: 23
CALCIUM: 10.5
CARBON DIOXIDE, TOTAL: 24
CHLORIDE: 100
CREATININE: 0.78
EGFR: 79
ENDOMYSIAL ANTIBODY IGA: NEGATIVE
GLOBULIN, TOTAL: 2
GLUCOSE: 112
IMMUNOGLOBULIN A, QN, SERUM: 97
POTASSIUM: 4.6
PROTEIN, TOTAL: 6.8
SODIUM: 143
T-TRANSGLUTAMINASE (TTG) IGA: <2

## 2023-05-15 ENCOUNTER — OFFICE VISIT (OUTPATIENT)
Dept: URBAN - METROPOLITAN AREA SURGERY CENTER 9 | Facility: SURGERY CENTER | Age: 78
End: 2023-05-15

## 2023-06-07 ENCOUNTER — DASHBOARD ENCOUNTERS (OUTPATIENT)
Age: 78
End: 2023-06-07

## 2023-06-07 ENCOUNTER — OFFICE VISIT (OUTPATIENT)
Dept: URBAN - METROPOLITAN AREA CLINIC 9 | Facility: CLINIC | Age: 78
End: 2023-06-07
Payer: MEDICARE

## 2023-06-07 VITALS
HEIGHT: 63 IN | WEIGHT: 154 LBS | BODY MASS INDEX: 27.29 KG/M2 | DIASTOLIC BLOOD PRESSURE: 82 MMHG | SYSTOLIC BLOOD PRESSURE: 120 MMHG

## 2023-06-07 DIAGNOSIS — K52.9 CHRONIC DIARRHEA: ICD-10-CM

## 2023-06-07 DIAGNOSIS — R10.9 ABDOMINAL PAIN, UNSPECIFIED ABDOMINAL LOCATION: ICD-10-CM

## 2023-06-07 DIAGNOSIS — K21.9 GASTRO-ESOPHAGEAL REFLUX DISEASE WITHOUT ESOPHAGITIS: ICD-10-CM

## 2023-06-07 PROCEDURE — 99214 OFFICE O/P EST MOD 30 MIN: CPT | Performed by: INTERNAL MEDICINE

## 2023-06-07 RX ORDER — LIRAGLUTIDE 6 MG/ML
AS DIRECTED INJECTION SUBCUTANEOUS
Status: ACTIVE | COMMUNITY
Start: 2023-04-27

## 2023-06-07 RX ORDER — LISINOPRIL 20 MG/1
1 TABLET TABLET ORAL ONCE A DAY
Status: ACTIVE | COMMUNITY

## 2023-06-07 RX ORDER — ALBUTEROL SULFATE 90 UG/1
1 PUFF AS NEEDED AEROSOL, METERED RESPIRATORY (INHALATION)
Status: ACTIVE | COMMUNITY
Start: 2023-04-27

## 2023-06-07 RX ORDER — TOLTERODINE 4 MG/1
TAKE ONE CAPSULE BY MOUTH ONE TIME DAILY CAPSULE, EXTENDED RELEASE ORAL
Qty: 90 UNSPECIFIED | Refills: 4 | Status: ACTIVE | COMMUNITY

## 2023-06-07 RX ORDER — ALBUTEROL SULFATE 90 UG/1
INHALE 2 PUFFS BY MOUTH 4 TIMES PER DAY AS NEEDED AEROSOL, METERED RESPIRATORY (INHALATION)
Qty: 18 GRAM | Refills: 2 | Status: ACTIVE | COMMUNITY

## 2023-06-07 RX ORDER — INSULIN DEGLUDEC INJECTION 100 U/ML
AS DIRECTED INJECTION, SOLUTION SUBCUTANEOUS
Status: ACTIVE | COMMUNITY
Start: 2023-04-27

## 2023-06-07 RX ORDER — TRAZODONE HYDROCHLORIDE 100 MG/1
1 TABLET AT BEDTIME TABLET ORAL ONCE A DAY
Qty: 30 | Status: ACTIVE | COMMUNITY
Start: 2023-04-27

## 2023-06-07 RX ORDER — SODIUM, POTASSIUM,MAG SULFATES 17.5-3.13G
177ML SOLUTION, RECONSTITUTED, ORAL ORAL ONCE
Qty: 1 | Refills: 0 | Status: ON HOLD | COMMUNITY

## 2023-06-07 RX ORDER — LEVOTHYROXINE SODIUM 112 UG/1
TAKE 1 TABLET BY MOUTH ONCE DAILY TABLET ORAL
Qty: 90 EACH | Refills: 3 | Status: ACTIVE | COMMUNITY

## 2023-06-07 NOTE — HPI-TODAY'S VISIT:
77-year-old here for follow-up.  We last saw her March 2022.  At that point she complained of diarrhea.  This was intermittent for years with she will get urgency and loose stools she was treated with budesonide in the past.  At that time we did not have colonoscopy biopsy records just colonoscopy.  That time she was having about twice a week loose stools.  She also has a history of small bacterial overgrowth in the past she has been treated with Zenpep rifaximin and budesonide.  I did order celiac but this was not done.  I put her on Benefiber and low FODMAPs.  We did talk about Viberzi and a recourse of rifaximin.  She just tried Benefiber twice a day and was doing great.  She was having 3-4 formed stools a day.  But then she came back with loose stools.  At last visit we tried Viberzi.  Viberzi 75 twice daily actually caused constipation Patient is referred for diarrhea.  She was doing well for a while but the Viberzi was actually stopping her up.  She is currently on fiber supplementation only.  She does okay with that and food avoidance potential of episodes was really significant urgency diarrhea and fecal accidents.  She also has been having this midepigastric discomfort really when her stomach is empty.  She was on Protonix for a long time but her primary care switch her to Pepcid twice a day.  She does not know if either of those really helped.  No sustained weight loss. She comes in for follow-up today.  She has been off her Viberzi.  She has been having a lot of urgency and diarrhea as well as acid reflux.  She is on a GLP-1.  She has been off her PPI.  She has not been able to do the procedures due to ride situation